# Patient Record
Sex: FEMALE | Race: OTHER | Employment: UNEMPLOYED | URBAN - METROPOLITAN AREA
[De-identification: names, ages, dates, MRNs, and addresses within clinical notes are randomized per-mention and may not be internally consistent; named-entity substitution may affect disease eponyms.]

---

## 2022-05-02 ENCOUNTER — TELEPHONE (OUTPATIENT)
Dept: FAMILY MEDICINE CLINIC | Age: 32
End: 2022-05-02

## 2022-05-02 ENCOUNTER — OFFICE VISIT (OUTPATIENT)
Dept: FAMILY MEDICINE CLINIC | Age: 32
End: 2022-05-02

## 2022-05-02 ENCOUNTER — HOSPITAL ENCOUNTER (OUTPATIENT)
Dept: LAB | Age: 32
Discharge: HOME | End: 2022-05-02

## 2022-05-02 VITALS
DIASTOLIC BLOOD PRESSURE: 78 MMHG | HEIGHT: 57 IN | TEMPERATURE: 97.8 F | SYSTOLIC BLOOD PRESSURE: 119 MMHG | BODY MASS INDEX: 30.63 KG/M2 | OXYGEN SATURATION: 97 % | HEART RATE: 100 BPM | WEIGHT: 142 LBS

## 2022-05-02 DIAGNOSIS — R42 VERTIGO: ICD-10-CM

## 2022-05-02 DIAGNOSIS — H11.001 PTERYGIUM OF RIGHT EYE: ICD-10-CM

## 2022-05-02 DIAGNOSIS — R42 VERTIGO: Primary | ICD-10-CM

## 2022-05-02 LAB
GLUCOSE POC: NORMAL MG/DL
HCG URINE, QL. (POC): NEGATIVE
HGB BLD-MCNC: 15.4 G/DL
VALID INTERNAL CONTROL?: YES

## 2022-05-02 PROCEDURE — 82306 VITAMIN D 25 HYDROXY: CPT

## 2022-05-02 PROCEDURE — 99203 OFFICE O/P NEW LOW 30 MIN: CPT | Performed by: FAMILY MEDICINE

## 2022-05-02 PROCEDURE — 85018 HEMOGLOBIN: CPT | Performed by: FAMILY MEDICINE

## 2022-05-02 PROCEDURE — 85025 COMPLETE CBC W/AUTO DIFF WBC: CPT

## 2022-05-02 PROCEDURE — 83036 HEMOGLOBIN GLYCOSYLATED A1C: CPT

## 2022-05-02 PROCEDURE — 80053 COMPREHEN METABOLIC PANEL: CPT

## 2022-05-02 PROCEDURE — 81025 URINE PREGNANCY TEST: CPT | Performed by: FAMILY MEDICINE

## 2022-05-02 PROCEDURE — 84443 ASSAY THYROID STIM HORMONE: CPT

## 2022-05-02 PROCEDURE — 82607 VITAMIN B-12: CPT

## 2022-05-02 PROCEDURE — 82962 GLUCOSE BLOOD TEST: CPT | Performed by: FAMILY MEDICINE

## 2022-05-02 RX ORDER — ONDANSETRON 4 MG/1
4 TABLET, ORALLY DISINTEGRATING ORAL
Qty: 20 TABLET | Refills: 0 | Status: SHIPPED | OUTPATIENT
Start: 2022-05-02 | End: 2022-08-15

## 2022-05-02 RX ORDER — MECLIZINE HYDROCHLORIDE 25 MG/1
25 TABLET ORAL
Qty: 30 TABLET | Refills: 0 | Status: SHIPPED | OUTPATIENT
Start: 2022-05-02 | End: 2022-05-12

## 2022-05-02 NOTE — TELEPHONE ENCOUNTER
Tc returning the pt's call. She had called the cbn phone today at 7:02 am. She had not left a message. The pt stated she already had seen the dr they had called her, and she had already had an appt. The pt was told ok then you do not need anything from me then right. She stated no.  Alba Cerda RN

## 2022-05-02 NOTE — PATIENT INSTRUCTIONS
Vértigo: Instrucciones de cuidado  Vertigo: Care Instructions  Instrucciones de cuidado     El vértigo es patrice sensación de que usted o el ambiente que le rodea se mueve cuando no es así. Con frecuencia se describe annemarie patrice sensación de girar, elvin vueltas, caer o inclinarse. El vértigo podría hacer que vomite o sienta náuseas. Podría tener dificultades para caminar o estar de pie y podría perder el equilibrio. El vértigo es a [employer] relacionado con un problema del oído interno, salvador puede tener otras causas más serias. Si el vértigo continúa, usted podría necesitar más pruebas para hallar eagle causa. La atención de seguimiento es patrice parte clave de eagle tratamiento y seguridad. Asegúrese de hacer y acudir a todas las citas, y llame a eagle médico si está teniendo problemas. También es patrice buena idea saber los resultados de dorys exámenes y mantener patrice lista de los medicamentos que evangelist. ¿Cómo puede cuidarse en el hogar? · No se acueste boca arriba. Elévese un poco. Ona podría reducir la sensación de girar. Mantenga los ojos abiertos. · Muévase despacio para no caer. · Si eagle médico le recomienda algún medicamento, tómelo exactamente según las indicaciones. · No conduzca cuando tenga vértigo. Ciertos ejercicios, conocidos annemarie ejercicios de Ian, pueden ayudar a disminuir el vértigo. Para hacer los ejercicios de Ian:  · Siéntese al borde de patrice cama o un sofá y acuéstese rápido del lado que le causa el peor vértigo. Acuéstese de lado, sobre el oído [place]. · Quédese en thong posición jose por lo menos 30 segundos o hasta que el vértigo pase. · Siéntese. Si esto le causa vértigo, espere a que pase. · Repita dell procedimiento del otro lado. · Repita esto 10 veces. Nani estos ejercicios 2 veces al día hasta que la sensación de vértigo desaparezca. ¿Cuándo debe pedir ayuda? Llame al 911 en cualquier momento que considere que necesita atención de emergencia.  Por ejemplo, llame si:    · Se desmayó (perdió el conocimiento).     · Tiene síntomas de un ataque cerebral. Estos podrían incluir:  ? Entumecimiento, hormigueo, debilitamiento o pérdida de movimiento repentinos en la kris, el brazo o la pierna, sobre todo si ocurre en un solo lado del cuerpo. ? Cambios repentinos en la visión. ? Dificultades repentinas para hablar. ? Confusión repentina o dificultad para comprender frases sencillas. ? Problemas repentinos para caminar o mantener el equilibrio. ? Dolor de [place] intenso y repentino, distinto de los tabitha de [place]. Llame a eagle médico ahora mismo o busque atención médica inmediata si:    · Tiene vértigo junto con fiebre, dolor de enrico o zumbido en los oídos.     · Tiene náuseas o vómito nuevos o éstos aumentan. Preste especial atención a los cambios en eagle negrito y asegúrese de comunicarse con eagle médico si:    · El vértigo empeora u ocurre con mayor frecuencia.     · El vértigo no mejora después de 2 semanas. ¿Dónde puede encontrar más información en inglés? [address] a http://www.gray.com/  Tomasa D198 en la búsqueda para aprender más acerca de \"Vértigo: Instrucciones de cuidado. \"  Revisado: 8 septiembre, 2021               Versión del contenido: 13.2  © 8483-0044 Healthwise, Incorporated. Las instrucciones de cuidado fueron adaptadas bajo licencia por [employer] (which disclaims liability or warranty for this information). Si usted tiene [place] afección médica o sobre estas instrucciones, siempre pregunte a eagle profesional de negrito. Healthwise, Incorporated niega toda garantía o responsabilidad por eagle uso de esta información. Ejercicios de [place] para el vértigo: Instrucciones de cuidado  [place] Exercises for Vertigo: Care Instructions  Instrucciones de cuidado  Existen ejercicios sencillos que pueden ayudarle a recuperar el equilibrio cuando tiene vértigo.  Si tiene la enfermedad de [place], vértigo posicional paroxístico denny (BPPV, por dorys siglas en inglés) u otro problema en el oído interno, puede tener vértigo de vez en cuando. Thompson Miguelina ejercicios apenas se levante en la Yasir Ohs y antes de irse a dormir. Podría marearse la primera vez que los cece. Si esto ocurre, intente hacerlos jose por lo menos 5 minutos. Magdalen Balm serie de ejercicios cada vez, comenzando con el russell de la lista. Es posible que le lleve varias semanas antes de que pueda hacer todos los ejercicios sin sentirse [place]. La atención de seguimiento es patrice parte clave de eagle tratamiento y seguridad. Asegúrese de hacer y acudir a todas las citas, y llame a eagle médico si está teniendo problemas. También es patrice buena idea saber los resultados de dorys exámenes y mantener patrice lista de los medicamentos que evangelist. ¿Cómo puede cuidarse en el hogar? Ejercicio 1  Sentado en el borde de la cama y sin  la enrico:  · Cynthia hacia arriba tanto annmearie pueda. · Cynthia hacia abajo tanto annemarie pueda. · Cynthia hacia un lado y el otro tanto annemarie pueda. · Estire eagle brazo y [place] en posición recta hacia adelante. Concéntrese en eagle dedo índice. Continúe enfocando el dedo mientras lo acerca hacia la nariz. Ejercicio 2  Sentado en el borde de la cama:  · Voltee la enrico hacia atrás lo más que pueda. · Voltee la enrico hacia adelante [employer] el mentón toque el [place]. · Gire la enrico de un lado a otro. · Russell cece estos ejercicios con los ojos abiertos. Luego inténtelo con los ojos cerrados. Ejercicio 3  Sentado en el borde de la cama:  · Encoja dorys hombros y levántelos hacia arriba, después relájelos. · Inclínese e intente tocar el suelo con los dedos. Luego vuelva a la posición de sentado. · Arroje patrice pelota pequeña de Kentrell Fury mano a la otra. Arroje la pelota por encima del nivel de los ojos de [place] que tenga que mirar [place].   Ejercicio 4  De pie (con alguien cerca si se siente incómodo):  · Repita el ejercicio 1.  · Repita el ejercicio 2.  · Pase patrice pelota entre las piernas y por encima de la enrico. · Siéntese y [employer] . Repítalo. Gire en dirección diferente cada vez que se pare. · Con la ayuda de alguien cerca suyo, intente hacer los ejercicios anteriores con los ojos cerrados. Ejercicio 5  En patrice habitación sin obstáculos:  · Camine hacia un arun de la habitación, gire a la derecha y vuelva al punto de zeinab. Ahora, repita y gire a la izquierda. · Suba y baje patrice pendiente. Ahora inténtelo con [place]. · Tomado del brazo de alguien, intente hacer estos ejercicios con los ojos cerrados. ¿Cuándo debe pedir ayuda? Preste especial atención a los cambios en eagle negrito y asegúrese de comunicarse con eagle médico si:    · No mejora annemarie se esperaba. ¿Dónde puede encontrar más información en inglés? Vaya a http://chuck-[email or web address].info/  Navneet Zaragoza Q418 en la búsqueda para aprender más acerca de \"Ejercicios de [place] para el vértigo: Instrucciones de cuidado. \"  Revisado: 8 septiembre, 2021               Versión del contenido: 13.2  © 2006-2022 Healthwise, Incorporated. Las instrucciones de cuidado fueron adaptadas bajo licencia por [employer] (which disclaims liability or warranty for this information). Si usted tiene [place] afección médica o sobre estas instrucciones, siempre pregunte a eagle profesional de negrito. Healthwise, Incorporated niega toda garantía o responsabilidad por eagle uso de esta información. Maniobra de Epley en el hogar para el vértigo: Ejercicios  Epley Maneuver at Home for Vertigo: Exercises  Instrucciones de cuidado  El vértigo es patrice sensación de que todo gira o le da vueltas cuando usted mueve la enrico. Eagle médico puede haberlo [employer] posiciones para ayudar a que eagle vértigo mejore más rápido. [place] se llama maniobra de Epley. Eagle médico también puede haberle pedido que nani estos ejercicios en el hogar.   Nani los ejercicios tan a menudo annemarie se lo recomiende el médico. Si eagle vértigo empeora, eagle médico puede hacerle cambiar el ejercicio o decirle que lo interrumpa. Cómo hacer los ejercicios  Paso 1    1. Siéntese al borde de patrice cama o un sofá. Paso 2    1. Gire la enrico a 39 grados en la dirección que le haya dicho el médico. Esta puede ser hacia el oído que le causa más vértigo. Paso 3    1. Inclínese hacia atrás [employer] esté recostado de espaldas. Tendría que seguir teniendo la Azucena Bream a 39 grados. Ricardo Jose M la enrico en el punto medio entre mirar hacia adelante y mirar hacia el costado. Mantenga esta posición jose 30 segundos. Si tiene vértigo, manténgase en esta posición hasta que se detenga. Paso 4    1. Gire la enrico a 80 grados hacia el oído que tiene Jason Zana. Debe tener la punta del mentón alineada con el hombro. Mantenga esta posición jose 30 segundos. Paso 5    1. Gire hacia el lado del oído con menos vértigo. Ahora debería estar mirando al piso. La atención de seguimiento es patrice parte clave de eagle tratamiento y seguridad. Asegúrese de hacer y acudir a todas las citas, y llame a eagle médico si está teniendo problemas. También es patrice buena idea saber los resultados de dorys exámenes y mantener patrice lista de los medicamentos que evangelist. ¿Dónde puede encontrar más información en inglés? Vaya a http://www.gray.com/  Tomasa W875 en la búsqueda para aprender más acerca de \"Maniobra de Epley en el hogar para el vértigo: Ejercicios. \"  Revisado: 13 diciembre, 2021               Versión del contenido: 13.2  © 4640-8626 Healthwise, [employer]. Las instrucciones de cuidado fueron adaptadas bajo licencia por [employer] (which disclaims liability or warranty for this information). Si usted tiene [place] afección médica o sobre estas instrucciones, siempre pregunte a eagle profesional de negrito.  [employer] niega toda garantía o responsabilidad por eagle uso de esta información.

## 2022-05-02 NOTE — PROGRESS NOTES
I have printed AVS and reviewed it with patient today. Patient verbalized understanding. I reviewed with patient medications sent to pharmacy and how the medicine is to be taken. Patient verbalized understanding. The patient was texted coupons for prescriptions and I explained how the coupons are used. Patient verbalized understanding. I reviewed the exercises for vertigo with the patient. Patient verbalized understanding. I instructed patient to schedule a follow-up appointment prior to leaving today. Patient verbalized understanding. Patient correctly stated her full name and date of birth prior to the information shared.  Roseann Martinez with the [place] [id number] assisted with this discharge.  Luis Davila RN

## 2022-05-02 NOTE — PROGRESS NOTES
Jeri Tripp is a 33 y.o. female   Chief Complaint   Patient presents with    Fatigue     x 10 days. Pt thinks she is pregnant. Pt is requesting PAP, last done 5 years ago    Well Woman     Pt states has Hx of stomach ulcera x 2 years         ASSESSMENT AND PLAN:    1. Vertigo  Possibly BPPV; aron hallpike was positive for reproduced symptoms (no nystagmus visualized) but any movement reproduces symptoms. Hgb 15.4  Urine pregnancy test negative  Blood glucose 168, ~2.5 hours after eating cereal.    Will check additional labs today. Sent home with Epley maneuver and Cawthorne vertigo exercises. Meclizine and zofran for symptomatic relief. Follow-up pending labs. - AMB POC HEMOGLOBIN (HGB)  - AMB POC GLUCOSE BLOOD, BY GLUCOSE MONITORING DEVICE  - AMB POC URINE PREGNANCY TEST, VISUAL COLOR COMPARISON  - METABOLIC PANEL, COMPREHENSIVE; Future  - VITAMIN D, 25 HYDROXY; Future  - VITAMIN B12; Future  - TSH 3RD GENERATION; Future  - HEMOGLOBIN A1C WITH EAG; Future  - CBC WITH AUTOMATED DIFF; Future  - meclizine (ANTIVERT) 25 mg tablet; Take 1 Tablet by mouth three (3) times daily as needed for Dizziness or Nausea for up to 10 days. Indications: sensation of spinning or whirling  Dispense: 30 Tablet; Refill: 0  - ondansetron (ZOFRAN ODT) 4 mg disintegrating tablet; Take 1 Tablet by mouth every eight (8) hours as needed for Nausea or Vomiting. Dispense: 20 Tablet; Refill: 0    2. Pteryium ,right eye  Small, not affecting vision. Can use OTC eyedrops for dry eye.    >>RETURN for pap with pap clinic or with me. SUBJECTIVE:    HPI:  Susan Bello. Saulo Jo is a 33 y.o. female who presents with about 10 days of dizziness and nausea. She has vomited small quantities NBNB x 3. The dizziness is lasting nearly all day, and is aggrevated by movement -- particulary standing up after crouching down. When she lies down and closes her eyes the symptoms resolve.    Drinking water makes her more nauseated. She is not interested in eating because of the nausea, but is tolerating foods like eggs, vegetables and fruits. Denies ear pain and headache. She feels a little off balance when she walks. She got a ride here because she didn't feel safe driving. No vision changes, but she does note a pterygium. PMH: \"nervous ulcers\" w/ hematemesis 2 years ago. Treated. Still has reflux symptoms. PSH: none  Meds: occasional gastritis medication  All: none  SH: Quit smoking over 18 years ago  FH: GM with cancer (uterine?) Mom with recent \"black spots in her Uterus\", being worked up. Review of Systems   Constitutional: Positive for malaise/fatigue. Negative for fever. HENT: Negative for congestion, ear discharge, ear pain, hearing loss and tinnitus. Eyes: Negative for blurred vision, double vision and pain. Respiratory: Negative for cough and shortness of breath. Cardiovascular: Negative for chest pain, palpitations and leg swelling. Gastrointestinal: Positive for heartburn, nausea and vomiting. Negative for abdominal pain, blood in stool, constipation and diarrhea. Genitourinary: Negative. Neurological: Positive for dizziness and weakness. Negative for headaches. /78 (BP 1 Location: Left upper arm, BP Patient Position: Sitting)   Pulse 100   Temp 97.8 °F (36.6 °C) (Temporal)   Ht 4' 9.17\" (1.452 m)   Wt 142 lb (64.4 kg)   LMP 03/22/2022   SpO2 97%   BMI 30.55 kg/m²     Physical Exam  Constitutional:       General: She is not in acute distress. Appearance: Normal appearance. Eyes:      Extraocular Movements: Extraocular movements intact. Pupils: Pupils are equal, round, and reactive to light. Comments: Right pterygium, small. Cardiovascular:      Rate and Rhythm: Normal rate and regular rhythm. Heart sounds: Normal heart sounds. Pulmonary:      Effort: Pulmonary effort is normal.      Breath sounds: Normal breath sounds.    Neurological:      Mental Status: She is alert. Cranial Nerves: Cranial nerves are intact. Sensory: Sensation is intact. Motor: Motor function is intact. Coordination: Romberg sign negative. Finger-Nose-Finger Test normal. Rapid alternating movements normal.      Gait: Gait abnormal (walking slowly and calculated). Tandem walk normal.      Deep Tendon Reflexes: Reflexes normal.      Comments: No nystagmus. Mariana Self reproduced symptoms but no nystagmus.   Head impulse test normal.

## 2022-05-02 NOTE — PROGRESS NOTES
Results for orders placed or performed in visit on 05/02/22   AMB POC HEMOGLOBIN (HGB)   Result Value Ref Range    Hemoglobin (POC) 15.4 G/DL   AMB POC GLUCOSE BLOOD, BY GLUCOSE MONITORING DEVICE   Result Value Ref Range    Glucose POC NF-168 MG/DL   AMB POC URINE PREGNANCY TEST, VISUAL COLOR COMPARISON   Result Value Ref Range    VALID INTERNAL CONTROL POC Yes     HCG urine, Ql. (POC) Negative Negative

## 2022-05-03 LAB
ALBUMIN SERPL-MCNC: 4.2 G/DL (ref 3.5–5)
ALBUMIN/GLOB SERPL: 1.2 {RATIO} (ref 1.1–2.2)
ALP SERPL-CCNC: 93 U/L (ref 45–117)
ALT SERPL-CCNC: 103 U/L (ref 12–78)
ANION GAP SERPL CALC-SCNC: 9 MMOL/L (ref 5–15)
AST SERPL-CCNC: 73 U/L (ref 15–37)
BASOPHILS # BLD: 0.1 K/UL (ref 0–0.1)
BASOPHILS NFR BLD: 1 % (ref 0–1)
BILIRUB SERPL-MCNC: 0.6 MG/DL (ref 0.2–1)
BUN SERPL-MCNC: 8 MG/DL (ref 6–20)
BUN/CREAT SERPL: 9 (ref 12–20)
CALCIUM SERPL-MCNC: 10.2 MG/DL (ref 8.5–10.1)
CHLORIDE SERPL-SCNC: 102 MMOL/L (ref 97–108)
CO2 SERPL-SCNC: 25 MMOL/L (ref 21–32)
CREAT SERPL-MCNC: 0.87 MG/DL (ref 0.55–1.02)
DIFFERENTIAL METHOD BLD: ABNORMAL
EOSINOPHIL # BLD: 0.4 K/UL (ref 0–0.4)
EOSINOPHIL NFR BLD: 4 % (ref 0–7)
ERYTHROCYTE [DISTWIDTH] IN BLOOD BY AUTOMATED COUNT: 12.4 % (ref 11.5–14.5)
EST. AVERAGE GLUCOSE BLD GHB EST-MCNC: 131 MG/DL
GLOBULIN SER CALC-MCNC: 3.6 G/DL (ref 2–4)
GLUCOSE SERPL-MCNC: 138 MG/DL (ref 65–100)
HBA1C MFR BLD: 6.2 % (ref 4–5.6)
HCT VFR BLD AUTO: 43.2 % (ref 35–47)
HGB BLD-MCNC: 14.6 G/DL (ref 11.5–16)
IMM GRANULOCYTES # BLD AUTO: 0 K/UL (ref 0–0.04)
IMM GRANULOCYTES NFR BLD AUTO: 0 % (ref 0–0.5)
LYMPHOCYTES # BLD: 3 K/UL (ref 0.8–3.5)
LYMPHOCYTES NFR BLD: 30 % (ref 12–49)
MCH RBC QN AUTO: 31.7 PG (ref 26–34)
MCHC RBC AUTO-ENTMCNC: 33.8 G/DL (ref 30–36.5)
MCV RBC AUTO: 93.7 FL (ref 80–99)
MONOCYTES # BLD: 1.1 K/UL (ref 0–1)
MONOCYTES NFR BLD: 11 % (ref 5–13)
NEUTS SEG # BLD: 5.4 K/UL (ref 1.8–8)
NEUTS SEG NFR BLD: 54 % (ref 32–75)
NRBC # BLD: 0 K/UL (ref 0–0.01)
NRBC BLD-RTO: 0 PER 100 WBC
PLATELET # BLD AUTO: 143 K/UL (ref 150–400)
PMV BLD AUTO: 12 FL (ref 8.9–12.9)
POTASSIUM SERPL-SCNC: 4.2 MMOL/L (ref 3.5–5.1)
PROT SERPL-MCNC: 7.8 G/DL (ref 6.4–8.2)
RBC # BLD AUTO: 4.61 M/UL (ref 3.8–5.2)
SODIUM SERPL-SCNC: 136 MMOL/L (ref 136–145)
TSH SERPL DL<=0.05 MIU/L-ACNC: 2.79 UIU/ML (ref 0.36–3.74)
VIT B12 SERPL-MCNC: 589 PG/ML (ref 193–986)
WBC # BLD AUTO: 9.9 K/UL (ref 3.6–11)

## 2022-05-04 LAB — 25(OH)D3 SERPL-MCNC: 33.3 NG/ML (ref 30–100)

## 2022-05-05 NOTE — PROGRESS NOTES
Can you please schedule a VV to review her lab results/follow up on dizziness.  Thank you.     ___  A1C 6.2 - c/w prediabetes  LFTs mild elevation 103/73      Otherwise normal CBC, CMP  Normal TSH, B12, Vit D.

## 2022-05-11 ENCOUNTER — VIRTUAL VISIT (OUTPATIENT)
Dept: FAMILY MEDICINE CLINIC | Age: 32
End: 2022-05-11

## 2022-05-11 DIAGNOSIS — R79.89 ELEVATED LFTS: ICD-10-CM

## 2022-05-11 DIAGNOSIS — R73.03 PREDIABETES: Primary | ICD-10-CM

## 2022-05-11 PROCEDURE — 99212 OFFICE O/P EST SF 10 MIN: CPT | Performed by: FAMILY MEDICINE

## 2022-05-11 NOTE — PROGRESS NOTES
Jeri Tripp is a 33 y.o. female   Chief Complaint   Patient presents with    Labs     results     >>>>>>>>>>>>>TELEPHONE ENCOUNTER<<<<<<<<<<<<<<<<<<<<      ASSESSMENT AND PLAN:    1. Prediabetes  A1C 6.2  Discussed dietary management. Repeat in 6 months    2. Elevated LFTs  Mild elevation. Asymptomatic. Will recheck at followup. SUBJECTIVE:    HPI:  Susan Bello. Saulo Jo is a 33 y.o. female who presents via telephone. She was seen 5/2 with dizziness and nausea. She reports her symptoms have resolved and she is feeling great. We reviewed her lab results - A1C c/w prediabetes, elevated LFTs. Otherwise normal CMP. Normal CBC, TSH, B12, Vit D. Her Platelets were 930. She is inquiring about dental resources. No new concerns. Review of Systems   Constitutional: Negative for fever and malaise/fatigue. Eyes: Negative for blurred vision. Respiratory: Negative for cough and shortness of breath. Cardiovascular: Negative for chest pain, palpitations and leg swelling. Gastrointestinal: Negative for abdominal pain, constipation, diarrhea, nausea and vomiting. Neurological: Negative for dizziness and headaches. There were no vitals taken for this visit. Physical Exam - not performed; telephone encounter.

## 2022-05-11 NOTE — PROGRESS NOTES
Tc to the pt with  Katt Weldon. The intake was done. The pt verified her name and . No vs equipment is available. Coordination of Care  1. Have you been to the ER, urgent care clinic since your last visit? Hospitalized since your last visit? No    2. Have you seen or consulted any other health care providers outside of the [address] [place] [address] since your last visit? Include any pap smears or colon screening. No    Does the patient need refills?  N/A    Learning Assessment Complete? no  Depression Screening complete in the past 12 months? yes

## 2022-05-11 NOTE — PROGRESS NOTES
Tc to the pt with Kitty Hinojosa for discharge. she verified her name and . She was texted the picture of the Dental resources and told she would have to ask about a payment plan for persons with out insurance.  Jd Heath RN

## 2022-05-17 ENCOUNTER — OFFICE VISIT (OUTPATIENT)
Dept: FAMILY MEDICINE CLINIC | Age: 32
End: 2022-05-17

## 2022-05-17 ENCOUNTER — HOSPITAL ENCOUNTER (OUTPATIENT)
Dept: LAB | Age: 32
Discharge: HOME | End: 2022-05-17

## 2022-05-17 VITALS
SYSTOLIC BLOOD PRESSURE: 113 MMHG | OXYGEN SATURATION: 99 % | BODY MASS INDEX: 32.39 KG/M2 | WEIGHT: 144 LBS | HEART RATE: 77 BPM | HEIGHT: 56 IN | DIASTOLIC BLOOD PRESSURE: 63 MMHG | TEMPERATURE: 99 F

## 2022-05-17 DIAGNOSIS — Z01.419 WELL WOMAN EXAM: Primary | ICD-10-CM

## 2022-05-17 PROCEDURE — 87624 HPV HI-RISK TYP POOLED RSLT: CPT

## 2022-05-17 PROCEDURE — 99213 OFFICE O/P EST LOW 20 MIN: CPT | Performed by: PHYSICIAN ASSISTANT

## 2022-05-17 PROCEDURE — 88175 CYTOPATH C/V AUTO FLUID REDO: CPT

## 2022-05-17 NOTE — PROGRESS NOTES
HISTORY OF PRESENT ILLNESS  Adriana Wilcox is a 33 y.o. female. HPI Nolon Homans is here for her pap smear with HPV testing. Patient has a double-pronged arm birth control  implant from [place] and would like to get it removed soon since she would like to have kids again. She is . Patient states it is normal to have abnormal periods due to the implant. Patient denies any pain today. Patient denies risk of domestic violence, dyspareunia, fever, chills. Visit Vitals  /63 (BP 1 Location: Left upper arm, BP Patient Position: Sitting)   Pulse 77   Temp 99 °F (37.2 °C) (Oral)   Ht 4' 7.91\" (1.42 m)   Wt 144 lb (65.3 kg)   LMP 2022   SpO2 99%   BMI 32.39 kg/m²         ASSESSMENT and Laura Doyle is a 33year old female getting her Pap and HPV testing done today. Schedule an appointment for arm birth control to be removed with CVAN. Plan to do Pap and HPV testing in 5 years, , unless there is an abnormality today.

## 2022-05-17 NOTE — PROGRESS NOTES
Assessment/Plan:    Diagnoses and all orders for this visit:    1. Well woman exam  -     PAP IG, APTIMA HPV AND RFX 16/18,45 (202222); Future    She has an implanon device (v shaped) that was placed in [place] and is due for removal next year. She might want to remove it early in order to attempt pregnancy  The PA student saw the pt with me and I did all of the exam myself as well        Umesh Mitchell PA-C  [id number] [facility] expressed understanding of this plan. An AVS was printed and given to the patient.      ----------------------------------------------------------------------    Chief Complaint   Patient presents with    Well Woman       History of Present Illness:  Pt presents for annual well woman exam   24 yo down to 1 yo  Has implanon device, she has irregular periods with the device. Has had it for 2 years now  Considering future pregnancy in the next year  She is , no risk of DV  No hx of abnl pap  Last pap about a year ago in [place]       No past medical history on file. Current Outpatient Medications   Medication Sig Dispense Refill    ondansetron (ZOFRAN ODT) 4 mg disintegrating tablet Take 1 Tablet by mouth every eight (8) hours as needed for Nausea or Vomiting. (Patient not taking: Reported on 2022) 20 Tablet 0       No Known Allergies    Social History     Tobacco Use    Smoking status: Former Smoker     Quit date:      Years since quittin.3    Smokeless tobacco: Never Used   Substance Use Topics    Alcohol use: Not Currently    Drug use: Never       No family history on file. Physical Exam:     Visit Vitals  /63 (BP 1 Location: Left upper arm, BP Patient Position: Sitting)   Pulse 77   Temp 99 °F (37.2 °C) (Oral)   Ht 4' 7.91\" (1.42 m)   Wt 144 lb (65.3 kg)   LMP 2022   SpO2 99%   BMI 32.39 kg/m²       A&Ox3  WDWN NAD  Respirations normal and non labored  Pelvic exam- ext neg for lesion or discharge.  Cervix with 2 waxy yellow lesions otherwise neg for lesion or discharge.  Uterus and adnexal exam neg for mass or tenderness

## 2022-05-17 NOTE — PROGRESS NOTES
TC made to patient. Discharge instructions discussed and medication explained. Patient verbalized understanding.

## 2022-08-15 ENCOUNTER — OFFICE VISIT (OUTPATIENT)
Dept: FAMILY MEDICINE CLINIC | Age: 32
End: 2022-08-15

## 2022-08-15 ENCOUNTER — HOSPITAL ENCOUNTER (OUTPATIENT)
Dept: GENERAL RADIOLOGY | Age: 32
Discharge: HOME | End: 2022-08-15

## 2022-08-15 VITALS
TEMPERATURE: 98.4 F | HEART RATE: 76 BPM | WEIGHT: 144 LBS | DIASTOLIC BLOOD PRESSURE: 66 MMHG | BODY MASS INDEX: 32.39 KG/M2 | HEIGHT: 56 IN | OXYGEN SATURATION: 99 % | SYSTOLIC BLOOD PRESSURE: 129 MMHG

## 2022-08-15 DIAGNOSIS — G56.03 BILATERAL CARPAL TUNNEL SYNDROME: Primary | ICD-10-CM

## 2022-08-15 DIAGNOSIS — M54.2 NECK PAIN: ICD-10-CM

## 2022-08-15 PROCEDURE — 72050 X-RAY EXAM NECK SPINE 4/5VWS: CPT

## 2022-08-15 PROCEDURE — 99213 OFFICE O/P EST LOW 20 MIN: CPT | Performed by: FAMILY MEDICINE

## 2022-08-15 RX ORDER — METHYLPREDNISOLONE 4 MG/1
TABLET ORAL
Qty: 1 DOSE PACK | Refills: 0 | Status: SHIPPED | OUTPATIENT
Start: 2022-08-15 | End: 2022-09-27 | Stop reason: ALTCHOICE

## 2022-08-15 NOTE — PROGRESS NOTES
Yessica Estrada seen at d/c, full name and  verified, given After visit Summary and reviewed today's visit with patient along with instructions on when it is recommended to come back. Good Rx coupons given/texted to patient as well as instructions on how to use at the pharmacy. I reviewed the medication list with the patient to ensure she knows how to and when to take her medications. Methylprednisolone Side effects, mechanisms of action and medication compliance were reiterated to ensure proper understanding. I have reviewed the provider's instructions with the patient, answering all questions to her satisfaction. Patient verbalized understanding.   John Cervantes, RN

## 2022-08-15 NOTE — PROGRESS NOTES
Coordination of Care  1. Have you been to the ER, urgent care clinic since your last visit? Hospitalized since your last visit? No    2. Have you seen or consulted any other health care providers outside of the [address] [place] [address] since your last visit? Include any pap smears or colon screening. No    Does the patient need refills? NO    Learning Assessment Complete?  yes  Depression Screening complete in the past 12 months? yes

## 2022-08-15 NOTE — PROGRESS NOTES
HISTORY OF PRESENT ILLNESS  Maria Esther Suarez is a 33 y.o. female. HPI  Patient states she has been having some discomfort,  feels she noticed when she is about to fall asleep, she feels numbness and tingling in the arms and radiates to the hands and fingers. She doesn't see any swelling. She has tried to do physical activity but with a lot of effort, she starts feeling the sensation. She also has noticed some pain starting in the mid upper back, right sided. She has noticed this problem for about 1 year. Patient states many years ago in [place] she had problems with sciatica. Review of Systems   Constitutional:  Negative for chills, fever and weight loss. Eyes:  Negative for blurred vision, double vision and photophobia. Respiratory:  Negative for cough, sputum production, shortness of breath and wheezing. Cardiovascular:  Negative for chest pain, palpitations and orthopnea. Neurological:  Positive for tingling. Numbness,  and pain,  hands and arms   /66 (BP 1 Location: Left upper arm, BP Patient Position: Sitting)   Pulse 76   Temp 98.4 °F (36.9 °C) (Temporal)   Ht 4' 7.91\" (1.42 m)   Wt 144 lb (65.3 kg)   SpO2 99%   BMI 32.39 kg/m²   Physical Exam  Constitutional:       General: She is not in acute distress. HENT:      Head: Normocephalic. Right Ear: Tympanic membrane normal.      Left Ear: Tympanic membrane normal.   Cardiovascular:      Rate and Rhythm: Normal rate and regular rhythm. Pulses: Normal pulses. Heart sounds: Normal heart sounds. No murmur heard. Pulmonary:      Effort: Pulmonary effort is normal.      Breath sounds: Normal breath sounds. No wheezing, rhonchi or rales. Abdominal:      General: Bowel sounds are normal. There is no distension. Palpations: Abdomen is soft. Tenderness: There is no abdominal tenderness. Hernia: No hernia is present. Musculoskeletal:      Cervical back: Normal range of motion.  No rigidity or tenderness. Comments: Tinel +, phalen + bilaterally   Neurological:      Mental Status: She is alert. ASSESSMENT and PLAN  Diagnoses and all orders for this visit:    1. Bilateral carpal tunnel syndrome  -     methylPREDNISolone (MEDROL DOSEPACK) 4 mg tablet; 6 tabs PO day 1, 5 tabs PO day 2, 4 tabs PO day 3, 3 tabs PO day 4, 2 tabs PO day 5, 1 tab PO day 6    2.  Neck pain  -     XR SPINE CERV 4 OR 5 V; Future    33year old with symptoms of carpal tunnel,  stretching exercises discussed,    Neck pain, chronic,  x ray ordered

## 2022-08-30 ENCOUNTER — TELEPHONE (OUTPATIENT)
Dept: FAMILY MEDICINE CLINIC | Age: 32
End: 2022-08-30

## 2022-09-07 ENCOUNTER — TELEPHONE (OUTPATIENT)
Dept: FAMILY MEDICINE CLINIC | Age: 32
End: 2022-09-07

## 2022-09-07 NOTE — TELEPHONE ENCOUNTER
OW called patient and started financial screening for Care Card. An appt was made for 9/20/22 at 10:30 am.  Pending POI, bank statements (2) and letter explaining cash deposits.

## 2022-09-12 ENCOUNTER — TELEPHONE (OUTPATIENT)
Dept: FAMILY MEDICINE CLINIC | Age: 32
End: 2022-09-12

## 2022-09-12 NOTE — TELEPHONE ENCOUNTER
Patient cancelled appt via text message. No reschedule. As per patient, she'd make an arrangement to pay for her bill.

## 2022-09-27 ENCOUNTER — OFFICE VISIT (OUTPATIENT)
Dept: FAMILY MEDICINE CLINIC | Age: 32
End: 2022-09-27

## 2022-09-27 VITALS
SYSTOLIC BLOOD PRESSURE: 127 MMHG | HEART RATE: 54 BPM | BODY MASS INDEX: 31.72 KG/M2 | DIASTOLIC BLOOD PRESSURE: 75 MMHG | WEIGHT: 141 LBS | OXYGEN SATURATION: 99 % | TEMPERATURE: 98.1 F | HEIGHT: 56 IN

## 2022-09-27 DIAGNOSIS — Z23 NEEDS FLU SHOT: ICD-10-CM

## 2022-09-27 DIAGNOSIS — G56.03 BILATERAL CARPAL TUNNEL SYNDROME: Primary | ICD-10-CM

## 2022-09-27 PROCEDURE — 99213 OFFICE O/P EST LOW 20 MIN: CPT | Performed by: FAMILY MEDICINE

## 2022-09-27 PROCEDURE — 90471 IMMUNIZATION ADMIN: CPT

## 2022-09-27 PROCEDURE — 90686 IIV4 VACC NO PRSV 0.5 ML IM: CPT

## 2022-09-27 NOTE — PROGRESS NOTES
Megan Pinedatabkee Brianne Luque seen at d/c, full name and  verified, given After visit Summary and reviewed today's visit with patient along with instructions on when it is recommended to come back. I reviewed the medication list with the patient to ensure she knows how to and when to take her medications. Side effects, mechanisms of action and medication compliance were reiterated to ensure proper understanding. Patient requests flu vaccine; consent form obtained; denies fever, egg allergy nor past reactions to any injection or immunization. Immunization given per protocol and recorded in [address] VIS information sheet given, explained possible S/E. Reviewed sx indicating need to be seen in ER. Pt had no adverse reaction at time of discharge. I have reviewed the provider's instructions with the patient, answering all questions to her satisfaction. Patient verbalized understanding.   Sher Mccarthy RN

## 2022-09-27 NOTE — PATIENT INSTRUCTIONS
Vaccine Information Statement    Influenza (Flu) Vaccine (Inactivated or Recombinant): What You Need to Know    Many vaccine information statements are available in Indonesian and other languages. See www.immunize.org/vis. Hojas de información sobre vacunas están disponibles en español y en muchos otros idiomas. Visite www.immunize.org/vis. 1. Why get vaccinated? Influenza vaccine can prevent influenza (flu). Flu is a contagious disease that spreads around the [place] [facility] every year, usually between October and May. Anyone can get the flu, but it is more dangerous for some people. Infants and young children, people 72 years and older, pregnant people, and people with certain health conditions or a weakened immune system are at greatest risk of flu complications. Pneumonia, bronchitis, sinus infections, and ear infections are examples of flu-related complications. If you have a medical condition, such as heart disease, cancer, or diabetes, flu can make it worse. Flu can cause fever and chills, sore throat, muscle aches, fatigue, cough, headache, and runny or stuffy nose. Some people may have vomiting and diarrhea, though this is more common in children than adults. In an average year, thousands of people in the [facility] die from flu, and many more are hospitalized. Flu vaccine prevents millions of illnesses and flu-related visits to the doctor each year. 2. Influenza vaccines     CDC recommends everyone 6 months and older get vaccinated every flu season. Children 6 months through 6years of age may need 2 doses during a single flu season. Everyone else needs only 1 dose each flu season. It takes about 2 weeks for protection to develop after vaccination. There are many flu viruses, and they are always changing. Each year a new flu vaccine is made to protect against the influenza viruses believed to be likely to cause disease in the upcoming flu season.  Even when the vaccine doesnt exactly match these viruses, it may still provide some protection. Influenza vaccine does not cause flu. Influenza vaccine may be given at the same time as other vaccines. 3. Talk with your health care provider    Tell your vaccination provider if the person getting the vaccine:  Has had an allergic reaction after a previous dose of influenza vaccine, or has any severe, life-threatening allergies   Has ever had Guillain-Barré Syndrome (also called GBS)    In some cases, your health care provider may decide to postpone influenza vaccination until a future visit. Influenza vaccine can be administered at any time during pregnancy. People who are or will be pregnant during influenza season should receive inactivated influenza vaccine. People with minor illnesses, such as a cold, may be vaccinated. People who are moderately or severely ill should usually wait until they recover before getting influenza vaccine. Your health care provider can give you more information. 4. Risks of a vaccine reaction    Soreness, redness, and swelling where the shot is given, fever, muscle aches, and headache can happen after influenza vaccination. There may be a very small increased risk of Guillain-Barré Syndrome (GBS) after inactivated influenza vaccine (the flu shot). [place] children who get the flu shot along with pneumococcal vaccine (PCV13) and/or DTaP vaccine at the same time might be slightly more likely to have a seizure caused by fever. Tell your health care provider if a child who is getting flu vaccine has ever had a seizure. People sometimes faint after medical procedures, including vaccination. Tell your provider if you feel dizzy or have vision changes or ringing in the ears. As with any medicine, there is a very remote chance of a vaccine causing a severe allergic reaction, other serious injury, or death. 5. What if there is a serious problem?     An allergic reaction could occur after the vaccinated person leaves the clinic. If you see signs of a severe allergic reaction (hives, swelling of the face and throat, difficulty breathing, a fast heartbeat, dizziness, or weakness), call 9-1-1 and get the person to the nearest hospital.    For other signs that concern you, call your health care provider. Adverse reactions should be reported to the Vaccine Adverse Event Reporting System (VAERS). Your health care provider will usually file this report, or you can do it yourself. Visit the VAERS website at www.vaers. [facility].gov or call 6-997.386.5132. VAERS is only for reporting reactions, and VAERS staff members do not give medical advice. 6. The National Vaccine Injury Compensation Program    The [facility] Vaccine Injury Compensation Program (VICP) is a federal program that was created to compensate people who may have been injured by certain vaccines. Claims regarding alleged injury or death due to vaccination have a time limit for filing, which may be as short as two years. Visit the VICP website at www.[facility].gov/vaccinecompensation or call 3-614.230.1736 to learn about the program and about filing a claim. 7. How can I learn more? Ask your health care provider. Call your local or state health department. Visit the website of the Food and Drug Administration (FDA) for vaccine package inserts and additional information at www.fda.gov/vaccines-blood-biologics/vaccines. Contact the Centers for Disease Control and Prevention (CDC): Call 3-147.251.9833 (1-800-CDC-INFO) or  Visit CDCs influenza website at www.cdc.gov/flu. Vaccine Information Statement   Inactivated Influenza Vaccine   8/6/2021  42 BRENDA Keller [id number]   Department of Health and Human Services  Centers for Disease Control and Prevention    Office Use Only

## 2022-09-27 NOTE — PROGRESS NOTES
Nessa Benoit is a 33 y.o. female   Chief Complaint   Patient presents with    Numbness     Patient c/o numbness and tingling on hands but worse on right hand         ASSESSMENT AND PLAN:    1. Bilateral carpal tunnel syndrome  Improved briefly with PO steroid dosepak but symptoms returned after <1 month. Will schedule with Dr. Jenny Green for CTS injections. 2. Needs flu shot  - INFLUENZA, FLUARIX, FLULAVAL, FLUZONE (AGE 6 MO+), AFLURIA(AGE 3Y+) IM, PF, 0.5 ML          SUBJECTIVE:    HPI:  Lila Gaitan. Mary Cortez is a 33 y.o. female who presents for followup on carpal tunnel syndrome. She was given a medrol dose concha at her last visit 8/15. She reports the symptoms improved with the steroids, but in less than a month they have returned. She is working now and using her hands a lot. She describes the sensation as similar to when you blow up a BP cuff too tightly on the arm. It affects her 1st-4th digits on the palmar aspect. She has been using a night splint and tries to use it during the day. In the morning her fingers feel like they're swollen and she has difficulty gripping, but that improves throughout the day. No new concerns. Review of Systems   Constitutional:  Negative for fever and malaise/fatigue. Eyes:  Negative for blurred vision. Respiratory:  Negative for cough and shortness of breath. Cardiovascular:  Negative for chest pain, palpitations and leg swelling. Gastrointestinal:  Negative for abdominal pain, constipation, diarrhea, nausea and vomiting. Musculoskeletal:  Positive for joint pain. Neurological:  Positive for tingling and focal weakness. Negative for dizziness and headaches.        /75 (BP 1 Location: Left upper arm, BP Patient Position: Sitting)   Pulse (!) 54   Temp 98.1 °F (36.7 °C) (Temporal)   Ht 4' 7.91\" (1.42 m)   Wt 141 lb (64 kg)   SpO2 99%   BMI 31.72 kg/m²     Physical Exam  Constitutional:       General: She is not in acute distress. Appearance: Normal appearance. Eyes:      Extraocular Movements: Extraocular movements intact. Conjunctiva/sclera: Conjunctivae normal.      Pupils: Pupils are equal, round, and reactive to light. Cardiovascular:      Rate and Rhythm: Normal rate and regular rhythm. Heart sounds: Normal heart sounds. Pulmonary:      Effort: Pulmonary effort is normal.      Breath sounds: Normal breath sounds. Musculoskeletal:      Comments: + tinel  + phalen     Neurological:      Mental Status: She is alert.

## 2022-09-27 NOTE — PROGRESS NOTES
Coordination of Care  1. Have you been to the ER, urgent care clinic since your last visit? Hospitalized since your last visit? No    2. Have you seen or consulted any other health care providers outside of the [address] [place] [address] since your last visit? Include any pap smears or colon screening. No    Does the patient need refills? NO    Learning Assessment Complete?  yes  Depression Screening complete in the past 12 months? yes

## 2022-11-09 ENCOUNTER — OFFICE VISIT (OUTPATIENT)
Dept: FAMILY MEDICINE CLINIC | Age: 32
End: 2022-11-09

## 2022-11-09 VITALS
BODY MASS INDEX: 31.94 KG/M2 | SYSTOLIC BLOOD PRESSURE: 118 MMHG | RESPIRATION RATE: 16 BRPM | TEMPERATURE: 98.1 F | DIASTOLIC BLOOD PRESSURE: 83 MMHG | WEIGHT: 142 LBS | HEART RATE: 62 BPM | OXYGEN SATURATION: 100 % | HEIGHT: 56 IN

## 2022-11-09 DIAGNOSIS — G56.03 BILATERAL CARPAL TUNNEL SYNDROME: Primary | ICD-10-CM

## 2022-11-09 PROCEDURE — 99213 OFFICE O/P EST LOW 20 MIN: CPT | Performed by: FAMILY MEDICINE

## 2022-11-09 PROCEDURE — 96372 THER/PROPH/DIAG INJ SC/IM: CPT

## 2022-11-09 RX ORDER — TRIAMCINOLONE ACETONIDE 40 MG/ML
40 INJECTION, SUSPENSION INTRA-ARTICULAR; INTRAMUSCULAR ONCE
Status: COMPLETED | OUTPATIENT
Start: 2022-11-09 | End: 2022-11-09

## 2022-11-09 RX ADMIN — TRIAMCINOLONE ACETONIDE 40 MG: 40 INJECTION, SUSPENSION INTRA-ARTICULAR; INTRAMUSCULAR at 10:28

## 2022-11-09 NOTE — PROGRESS NOTES
I have printed AVS and reviewed it with patient today. Patient verbalized understanding. I reviewed the patient instructions with the patient on carpal tunnel syndrome. Patient verbalized understanding. Patient correctly stated her full name and date of birth prior to the information shared.  [id number] with the [employer] assisted with this discharge. Leesa Lombard, RN.

## 2022-11-09 NOTE — PROGRESS NOTES
HISTORY OF PRESENT ILLNESS  Faith Martin is a 27 y.o. female. HPI  Patient is here for carpal tunnel   Review of Systems   Constitutional:  Negative for chills, fever and weight loss. Eyes:  Negative for blurred vision, double vision and photophobia. Respiratory:  Negative for cough, sputum production, shortness of breath and wheezing. Cardiovascular:  Negative for chest pain, palpitations and orthopnea. Neurological:  Positive for tingling. Numbness,  and pain,  hands and arms   /83 (BP 1 Location: Left upper arm, BP Patient Position: Sitting, BP Cuff Size: Adult)   Pulse 62   Temp 98.1 °F (36.7 °C) (Temporal)   Resp 16   Ht 4' 7.91\" (1.42 m)   Wt 142 lb (64.4 kg)   SpO2 100%   BMI 31.94 kg/m²     Physical Exam  Constitutional:       General: She is not in acute distress. HENT:      Head: Normocephalic. Right Ear: Tympanic membrane normal.      Left Ear: Tympanic membrane normal.   Cardiovascular:      Rate and Rhythm: Normal rate and regular rhythm. Pulses: Normal pulses. Heart sounds: Normal heart sounds. No murmur heard. Pulmonary:      Effort: Pulmonary effort is normal.      Breath sounds: Normal breath sounds. No wheezing, rhonchi or rales. Abdominal:      General: Bowel sounds are normal. There is no distension. Palpations: Abdomen is soft. Tenderness: There is no abdominal tenderness. Hernia: No hernia is present. Musculoskeletal:      Cervical back: Normal range of motion. No rigidity or tenderness. Comments: Tinel +, phalen + bilaterally   Neurological:      Mental Status: She is alert. ASSESSMENT and PLAN  Diagnoses and all orders for this visit:    1. Bilateral carpal tunnel syndrome  -     triamcinolone acetonide (KENALOG-40) 40 mg/mL injection 40 mg        Procedure:  After consent was obtained, using sterile technique the wrist joints were prepped using Chlorprep.    Kenalog 20 mg was mixed with 1% lidocaine 0.5 ml  and injected into each wrist and the needle withdrawn. The procedure was well tolerated. The patient is asked to continue to rest the joint for a few more days before resuming regular activities. It may be more painful for the first 1-2 days. Watch for fever, or increased swelling or persistent pain in the joint. Call or return to clinic prn if such symptoms occur or there is failure to improve as anticipated.

## 2022-11-09 NOTE — PROGRESS NOTES
Coordination of Care  1. Have you been to the ER, urgent care clinic since your last visit? Hospitalized since your last visit? No    2. Have you seen or consulted any other health care providers outside of the [address] [place] [address] since your last visit? Include any pap smears or colon screening. No    Does the patient need refills? N/A    Learning Assessment Complete? no  Depression Screening complete in the past 12 months? yes   . Chief Complaint   Patient presents with    Follow-up     Carpal tunnel      Visit Vitals  /83 (BP 1 Location: Left upper arm, BP Patient Position: Sitting, BP Cuff Size: Adult)   Pulse 62   Temp 98.1 °F (36.7 °C) (Temporal)   Resp 16   Ht 4' 7.91\" (1.42 m)   Wt 142 lb (64.4 kg)   SpO2 100%   BMI 31.94 kg/m²     Francisca Rodriguez assisted as interpretor for this interview.

## 2023-01-23 ENCOUNTER — OFFICE VISIT (OUTPATIENT)
Dept: FAMILY MEDICINE CLINIC | Age: 33
End: 2023-01-23

## 2023-01-23 VITALS
HEART RATE: 81 BPM | WEIGHT: 144 LBS | OXYGEN SATURATION: 100 % | TEMPERATURE: 98.6 F | BODY MASS INDEX: 32.39 KG/M2 | HEIGHT: 56 IN | DIASTOLIC BLOOD PRESSURE: 83 MMHG | SYSTOLIC BLOOD PRESSURE: 131 MMHG

## 2023-01-23 DIAGNOSIS — Z30.46 NEXPLANON REMOVAL: Primary | ICD-10-CM

## 2023-01-23 PROCEDURE — 11982 REMOVE DRUG IMPLANT DEVICE: CPT | Performed by: FAMILY MEDICINE

## 2023-01-23 PROCEDURE — 99214 OFFICE O/P EST MOD 30 MIN: CPT | Performed by: FAMILY MEDICINE

## 2023-01-23 NOTE — PROGRESS NOTES
Name and  confirmed w/ patient. An After Visit Summary was provided and all discharge instructions were reviewed with the patient including: dressing instructions per MD note, and what to look for/do regarding signs of infection. Time for questions and answers provided, patient verbalized understanding. Patient discharged from clinic in stable condition. [facility]  #[id number] assisted with interpretation.

## 2023-01-23 NOTE — PROGRESS NOTES
Coordination of Care  1. Have you been to the ER, urgent care clinic since your last visit? Hospitalized since your last visit? No    2. Have you seen or consulted any other health care providers outside of the [address] [place] [address] since your last visit? Include any pap smears or colon screening. No    Does the patient need refills? NO    Learning Assessment Complete?  yes  Depression Screening complete in the past 12 months? yes

## 2023-01-23 NOTE — PROGRESS NOTES
Helena Snyder is a 27 y.o. female   Chief Complaint   Patient presents with    Other     Patient requesting to have implant removed. ASSESSMENT AND PLAN:    1. Nexplanon removal  Successful removal of 2 rods. Care and Return precautions reviewed. Patient aware that she can potentially conceive from now on.    - REMOVAL DRUG IMPLANT DEVICE      SUBJECTIVE:    HPI:  [employer]. Leonid Marin is a 27 y.o. female who presents to request removal of her nexplanon-like device. It is the one from [facility] that uses 2 rods, lasts for 5 years. She reports it is almost at 5 years. She is also interested in having 1 more child. She has 3 currently,  one is grown and lives apart. No other concerns today. Review of Systems   Constitutional:  Negative for fever and malaise/fatigue. Eyes:  Negative for blurred vision. Respiratory:  Negative for cough and shortness of breath. Cardiovascular:  Negative for chest pain, palpitations and leg swelling. Gastrointestinal:  Negative for abdominal pain, constipation, diarrhea, nausea and vomiting. Neurological:  Negative for dizziness and headaches. /83 (BP 1 Location: Left upper arm, BP Patient Position: Sitting)   Pulse 81   Temp 98.6 °F (37 °C) (Temporal)   Ht 4' 7.91\" (1.42 m)   Wt 144 lb (65.3 kg)   LMP  (LMP Unknown)   SpO2 100%   BMI 32.39 kg/m²     Physical Exam  Constitutional:       General: She is not in acute distress. Appearance: Normal appearance. Eyes:      Extraocular Movements: Extraocular movements intact. Conjunctiva/sclera: Conjunctivae normal.      Pupils: Pupils are equal, round, and reactive to light. Cardiovascular:      Rate and Rhythm: Normal rate and regular rhythm. Heart sounds: Normal heart sounds. Pulmonary:      Effort: Pulmonary effort is normal.      Breath sounds: Normal breath sounds.    Skin:     Comments: 2 rods palpated at ~80 to eachother in LUE>   Neurological: Mental Status: She is alert. NEXPLANON PROCEDURE NOTE:    Anesthesia: 1.5% Lidocaine w/ epinephrine 5 ml   Procedure: Consent obtained. A time-out was performed prior to  initiating procedure to be sure of right patient and right location. The  area surrounding the implant was prepared with Choloraprep and draped  in the usual sterile manner. The site was anesthetized with 5ccs of 1.5% lidocaine with epi. A skin incision was made over the distal aspect of the device. The  capsule lysed sharply and the device removed using a hemostat. This was repeated for the second gerber. Hemostasis was assured. The site was dressed with SteriStrips and a pressure dressing. The patient tolerated the procedure  well. Followup: The patient tolerated the procedure well without  complications. Standard post-procedure care is explained and return  precautions are given.

## 2024-01-30 ENCOUNTER — TELEPHONE (OUTPATIENT)
Age: 34
End: 2024-01-30

## 2024-01-30 NOTE — TELEPHONE ENCOUNTER
CHW called patient after receiving staff message. Patient said that she had already solved what she needed to solve.